# Patient Record
Sex: MALE | Race: WHITE | Employment: FULL TIME | URBAN - METROPOLITAN AREA
[De-identification: names, ages, dates, MRNs, and addresses within clinical notes are randomized per-mention and may not be internally consistent; named-entity substitution may affect disease eponyms.]

---

## 2017-01-18 ENCOUNTER — TELEPHONE (OUTPATIENT)
Dept: SLEEP MEDICINE | Age: 52
End: 2017-01-18

## 2017-03-14 ENCOUNTER — OFFICE VISIT (OUTPATIENT)
Dept: SLEEP MEDICINE | Age: 52
End: 2017-03-14

## 2017-03-14 VITALS
OXYGEN SATURATION: 100 % | HEIGHT: 70 IN | BODY MASS INDEX: 27.2 KG/M2 | TEMPERATURE: 98.1 F | DIASTOLIC BLOOD PRESSURE: 87 MMHG | WEIGHT: 190 LBS | SYSTOLIC BLOOD PRESSURE: 129 MMHG | HEART RATE: 61 BPM

## 2017-03-14 DIAGNOSIS — G47.33 OSA (OBSTRUCTIVE SLEEP APNEA): Primary | ICD-10-CM

## 2017-03-14 DIAGNOSIS — E66.3 OVERWEIGHT (BMI 25.0-29.9): ICD-10-CM

## 2017-03-14 RX ORDER — ATORVASTATIN CALCIUM 40 MG/1
TABLET, FILM COATED ORAL DAILY
COMMUNITY

## 2017-03-14 RX ORDER — TELMISARTAN 40 MG/1
40 TABLET ORAL DAILY
COMMUNITY

## 2017-03-14 NOTE — PROGRESS NOTES
HSAT Setup    · Patient was educated on proper hookup and operation of the HSAT. · Instruction forms and documentation were reviewed and signed. · The patient demonstrated good understanding of the HSAT. · General information regarding operations and maintenance of the device was provided. · Patient was provided information on sleep apnea including coresponding risk factors and the importance of proper treatment. · Follow-up appointment was made to return the HSAT. He will be contacted once the results have been reviewed. · Patient was asked to contact our office for any problems regarding his home sleep test study.

## 2017-03-14 NOTE — PROGRESS NOTES
217 [facility] [place]. [place], [address]  Tel.  [phone]  Fax. 100 [facility] 60  [place], [address] [facility]  Tel.  [phone]  Fax. [phone] [address]   Tel.  [phone]  Fax. [phone]         Subjective:      Gissell Mckeon is an 45 y.o. male referred for evaluation for a sleep disorder. He complains of excessive daytime sleepiness associated with awakening in the middle of the night because of snoring. Symptoms began a few years ago, gradually worsening since that time. He usually can fall asleep in 5 minutes. Family or house members note snoring, choking, periods of not breathing. He denies completely or partially paralyzed while falling asleep or waking up. Gissell Mckeon does not wake up frequently at night. He is not bothered by waking up too early and left unable to get back to sleep. He actually sleeps about 7 hours at night and wakes up about   times during the night. He does not work shifts:  . Maximus Moore indicates he does not get too little sleep at night. His bedtime is 2330. He awakens at 0700. He does not take naps. . He has the following observed behaviors: Loud snoring, Pauses in breathing, Sitting up in bed while still asleep, Getting out of the bed while still asleep, Sleep talking, Sleepwalking;  . Other remarks: waking with a gasp or snort    [place] Sleepiness Score: 8   which reflect moderate daytime drowsiness. Allergies   Allergen Reactions    Dilaudid [Hydromorphone (Bulk)] Nausea and Vomiting          Pcn [Penicillins] Hives     childhood         Current Outpatient Prescriptions:     atorvastatin (LIPITOR) 40 mg tablet, Take  by mouth daily. , Disp: , Rfl:     telmisartan (MICARDIS) 40 mg tablet, Take 40 mg by mouth daily. , Disp: , Rfl:     propranolol (INDERAL) 60 mg tablet, Take 1 Tab by mouth nightly., Disp: 30 Tab, Rfl: 0    fluticasone (FLONASE) 50 mcg/Actuation nasal spray, USE 1 SPRAYS IN EACH NOSTRIL EVERY DAY, Disp: 1 Bottle, Rfl: 5    amitriptyline (ELAVIL) 100 mg tablet, Take 1 Tab by mouth nightly., Disp: 30 Tab, Rfl: 0     He  has a past medical history of Chronic nasal congestion; Hypertension; and Migraine. He  has no past surgical history on file. He family history includes Heart Disease in an other family member; Hypertension in an other family member. He  reports that he has never smoked. He has never used smokeless tobacco. He reports that he drinks alcohol. He reports that he does not use illicit drugs. Review of Systems:  Constitutional:  No significant weight loss or weight gain. Eyes:  No blurred vision. CVS:  No significant chest pain  Pulm:  No significant shortness of breath  GI:  No significant nausea or vomiting  :  No significant nocturia  Musculoskeletal:  No significant joint pain at night  Skin:  No significant rashes  Neuro:  No significant dizziness   Psych:  No active mood issues    Sleep Review of Systems: notable for no difficulty falling asleep; frequent awakenings at night;  irregular dreaming noted; no nightmares ; no early morning headaches; no memory problems; no concentration issues; no history of any automobile or occupational accidents due to daytime drowsiness. Objective:     Visit Vitals    /87    Pulse 61    Temp 98.1 °F (36.7 °C)    Ht 5' 10\" (1.778 m)    Wt 190 lb (86.2 kg)    SpO2 100%    BMI 27.26 kg/m2         General:   Not in acute distress   Eyes:  Anicteric sclerae, no obvious strabismus   Nose:  No obvious nasal septum deviation    Oropharynx:   Class 3 oropharyngeal outlet, thick tongue base, enlarged uvula, low-lying soft palate, narrow tonsilo-pharyngeal pilars   Tonsils:   tonsils are unappreciated   Neck:   Neck circ.  in \"inches\": 15; midline trachea   Chest/Lungs:  Equal lung expansion, clear on auscultation    CVS:  Normal rate, regular rhythm; no JVD   Skin:  Warm to touch; no obvious rashes Neuro: No focal deficits ; no obvious tremor    Psych:  Normal affect,  normal countenance;          Assessment:       ICD-10-CM ICD-9-CM    1. KUSHAL (obstructive sleep apnea) G47.33 327.23    2. Overweight (BMI 25.0-29. 9) E66.3 278.02          Plan:     * The patient currently has a Moderate Risk for having sleep apnea. STOP-BANG score 5.  * PSG was ordered for initial evaluation. * He was provided information on sleep apnea including coresponding risk factors and the importance of proper treatment. * Counseling was provided regarding proper sleep hygiene and safe driving. * We'll call him with test results. * He is interested in an oral appliance if he meets criteria. I have reviewed/discussed the above normal BMI with the patient. I have recommended the following interventions: dietary management education, guidance, and counseling . The plan is as follows: I have counseled this patient on diet and exercise regimens. .    Thank you for allowing us to participate in your patient's medical care. We'll keep you updated on these investigations.     Marcelle Bloom M.D.  (electronically signed)  Diplomate in Sleep Medicine, [facility]

## 2017-03-14 NOTE — PATIENT INSTRUCTIONS
217 [facility] [place], [address]  Tel.  [phone]  Fax. 100 [facility] 60  [place], [address] [facility]  Tel.  [phone]  Fax. [phone] [address] [address] [address]ail [address]  Tel.  [phone]  Fax. [phone]     Sleep Apnea: After Your Visit  Your Care Instructions  Sleep apnea occurs when you frequently stop breathing for 10 seconds or longer during sleep. It can be mild to severe, based on the number of times per hour that you stop breathing or have slowed breathing. Blocked or narrowed airways in your nose, mouth, or throat can cause sleep apnea. Your airway can become blocked when your throat muscles and tongue relax during sleep. Sleep apnea is common, occurring in 1 out of 20 individuals. Individuals having any of the following characteristics should be evaluated and treated right away due to high risk and detrimental consequences from untreated sleep apnea:  1. Obesity  2. Congestive Heart failure  3. Atrial Fibrillation  4. Uncontrolled Hypertension  5. Type II Diabetes  6. Night-time Arrhythmias  7. Stroke  8. Pulmonary Hypertension  9. High-risk Driving Populations (pilots, truck drivers, etc.)  10. Patients Considering Weight-loss Surgery    How do you know you have sleep apnea? You probably have sleep apnea if you answer 'yes' to 3 or more of the following questions:  S - Have you been told that you Snore? T - Are you often Tired during the day? O - Has anyone Observed you stop breathing while sleeping? P- Do you have (or are being treated for) high blood Pressure? B - Are you obese (Body Mass Index > 35)? A - Is your Age 48years old or older? N - Is your Neck size greater than 16 inches? G - Are you male Gender? A sleep physician can prescribe a breathing device that prevents tissues in the throat from blocking your airway.  Or your doctor may recommend using a dental device (oral breathing device) to help keep your airway open. In some cases, surgery may be needed to remove enlarged tissues in the throat. Follow-up care is a key part of your treatment and safety. Be sure to make and go to all appointments, and call your doctor if you are having problems. It's also a good idea to know your test results and keep a list of the medicines you take. How can you care for yourself at home? · Lose weight, if needed. It may reduce the number of times you stop breathing or have slowed breathing. · Go to bed at the same time every night. · Sleep on your side. It may stop mild apnea. If you tend to roll onto your back, sew a pocket in the back of your pajama top. Put a tennis ball into the pocket, and stitch the pocket shut. This will help keep you from sleeping on your back. · Avoid alcohol and medicines such as sleeping pills and sedatives before bed. · Do not smoke. Smoking can make sleep apnea worse. If you need help quitting, talk to your doctor about stop-smoking programs and medicines. These can increase your chances of quitting for good. · Prop up the head of your bed 4 to 6 inches by putting bricks under the legs of the bed. · Treat breathing problems, such as a stuffy nose, caused by a cold or allergies. · Use a continuous positive airway pressure (CPAP) breathing machine if lifestyle changes do not help your apnea and your doctor recommends it. The machine keeps your airway from closing when you sleep. · If CPAP does not help you, ask your doctor whether you should try other breathing machines. A bilevel positive airway pressure machine has two types of air pressureâone for breathing in and one for breathing out. Another device raises or lowers air pressure as needed while you breathe. · If your nose feels dry or bleeds when using one of these machines, talk with your doctor about increasing moisture in the air. A humidifier may help.   · If your nose is runny or stuffy from using a breathing machine, talk with your doctor about using decongestants or a corticosteroid nasal spray. When should you call for help? Watch closely for changes in your health, and be sure to contact your doctor if:  · You still have sleep apnea even though you have made lifestyle changes. · You are thinking of trying a device such as CPAP. · You are having problems using a CPAP or similar machine. Where can you learn more? Go to MarginLeft. Enter Q665 in the search box to learn more about \"Sleep Apnea: After Your Visit. \"   © 0383-8265 Healthwise, [employer]. Care instructions adapted under license by Parviz Dong (which disclaims liability or warranty for this information). This care instruction is for use with your licensed healthcare professional. If you have questions about a medical condition or this instruction, always ask your healthcare professional. Duey Zackary any warranty or liability for your use of this information. PROPER SLEEP HYGIENE    What to avoid  · Do not have drinks with caffeine, such as coffee or black tea, for 8 hours before bed. · Do not smoke or use other types of tobacco near bedtime. Nicotine is a stimulant and can keep you awake. · Avoid drinking alcohol late in the evening, because it can cause you to wake in the middle of the night. · Do not eat a big meal close to bedtime. If you are hungry, eat a light snack. · Do not drink a lot of water close to bedtime, because the need to urinate may wake you up during the night. · Do not read or watch TV in bed. Use the bed only for sleeping and sexual activity. What to try  · Go to bed at the same time every night, and wake up at the same time every morning. Do not take naps during the day. · Keep your bedroom quiet, dark, and cool. · Get regular exercise, but not within 3 to 4 hours of your bedtime. .  · Sleep on a comfortable pillow and mattress.   · If watching the clock makes you anxious, turn it facing away from you so you cannot see the time. · If you worry when you lie down, start a worry book. Well before bedtime, write down your worries, and then set the book and your concerns aside. · Try meditation or other relaxation techniques before you go to bed. · If you cannot fall asleep, get up and go to another room until you feel sleepy. Do something relaxing. Repeat your bedtime routine before you go to bed again. · Make your house quiet and calm about an hour before bedtime. Turn down the lights, turn off the TV, log off the computer, and turn down the volume on music. This can help you relax after a busy day. Drowsy Driving  The [address] [place] [address] Road Traffic Safety Administration cites drowsiness as a causing factor in more than 880,040 police reported crashes annually, resulting in 76,000 injuries and 1,500 deaths. Other surveys suggest 55% of people polled have driven while drowsy in the past year, 23% had fallen asleep but not crashed, 3% crashed, and 2% had and accident due to drowsy driving. Who is at risk? Young Drivers: One study of drowsy driving accidents states that 55% of the drivers were under 25 years. Of those, 75% were male. Shift Workers and Travelers: People who work overnight or travel across time zones frequently are at higher risk of experiencing Circadian Rhythm Disorders. They are trying to work and function when their body is programed to sleep. Sleep Deprived: Lack of sleep has a serious impact on your ability to pay attention or focus on a task. Consistently getting less than the average of 8 hours your body needs creates partial or cumulative sleep deprivation. Untreated Sleep Disorders: Sleep Apnea, Narcolepsy, R.L.S., and other sleep disorders (untreated) prevent a person from getting enough restful sleep. This leads to excessive daytime sleepiness and increases the risk for drowsy driving accidents by up to 7 times.   Medications / Alcohol: Even over the counter medications can cause drowsiness. Medications that impair a drivers attention should have a warning label. Alcohol naturally makes you sleepy and on its own can cause accidents. Combined with excessive drowsiness its effects are amplified. Signs of Drowsy Driving:   * You don't remember driving the last few miles   * You may drift out of your benjamin   * You are unable to focus and your thoughts wander   * You may yawn more often than normal   * You have difficulty keeping your eyes open / nodding off   * Missing traffic signs, speeding, or tailgating  Prevention-   Good sleep hygiene, lifestyle and behavioral choices have the most impact on drowsy driving. There is no substitute for sleep and the average person requires 8 hours nightly. If you find yourself driving drowsy, stop and sleep. Consider the sleep hygiene tips provided during your visit as well. Medication Refill Policy: Refills for all medications require 1 week advance notice. Please have your pharmacy fax a refill request. We are unable to fax, or call in \"controled substance\" medications and you will need to pick these prescriptions up from our office. [employer] Activation    Thank you for requesting access to [employer]. Please follow the instructions below to securely access and download your online medical record. [employer] allows you to send messages to your doctor, view your test results, renew your prescriptions, schedule appointments, and more. How Do I Sign Up? 1. In your internet browser, go to [email or web address]. 2. Click on the First Time User? Click Here link in the Sign In box. You will see the New Member Sign Up page. 3. Enter your [employer] Access Code exactly as it appears below. You will not need to use this code after youve completed the sign-up process. If you do not sign up before the expiration date, you must request a new code. [employer] Access Code:  [email or web address]  Expires: 6/12/2017  3:16 PM (This is the date your [employer] access code will )    4. Enter the last four digits of your Social Security Number (xxxx) and Date of Birth (mm/dd/yyyy) as indicated and click Submit. You will be taken to the next sign-up page. 5. Create a [employer] ID. This will be your [employer] login ID and cannot be changed, so think of one that is secure and easy to remember. 6. Create a [employer] password. You can change your password at any time. 7. Enter your Password Reset Question and Answer. This can be used at a later time if you forget your password. 8. Enter your e-mail address. You will receive e-mail notification when new information is available in [address] 9. Click Sign Up. You can now view and download portions of your medical record. 10. Click the Download Summary menu link to download a portable copy of your medical information. Additional Information    If you have questions, please call [phone]. Remember, [employer] is NOT to be used for urgent needs. For medical emergencies, dial 911. Starting a Weight Loss Plan: After Your Visit  Your Care Instructions  If you are thinking about losing weight, it can be hard to know where to start. Your doctor can help you set up a weight loss plan that best meets your needs. You may want to take a class on nutrition or exercise, or join a weight loss support group. If you have questions about how to make changes to your eating or exercise habits, ask your doctor about seeing a registered dietitian or an exercise specialist.  It can be a big challenge to lose weight. But you do not have to make huge changes at once. Make small changes, and stick with them. When those changes become habit, add a few more changes. If you do not think you are ready to make changes right now, try to pick a date in the future. Make an appointment to see your doctor to discuss whether the time is right for you to start a plan. Follow-up care is a key part of your treatment and safety.  Be sure to make and go to all appointments, and call your doctor if you are having problems. It's also a good idea to know your test results and keep a list of the medicines you take. How can you care for yourself at home? · Set realistic goals. Many people expect to lose much more weight than is likely. A weight loss of 5% to 10% of your body weight may be enough to improve your health. · Get family and friends involved to provide support. Talk to them about why you are trying to lose weight, and ask them to help. They can help by participating in exercise and having meals with you, even if they may be eating something different. · Find what works best for you. If you do not have time or do not like to cook, a program that offers meal replacement bars or shakes may be better for you. Or if you like to prepare meals, finding a plan that includes daily menus and recipes may be best.  · Ask your doctor about other health professionals who can help you achieve your weight loss goals. ¨ A dietitian can help you make healthy changes in your diet. ¨ An exercise specialist or  can help you develop a safe and effective exercise program.  ¨ A counselor or psychiatrist can help you cope with issues such as depression, anxiety, or family problems that can make it hard to focus on weight loss. · Consider joining a support group for people who are trying to lose weight. Your doctor can suggest groups in your area. Where can you learn more? Go to Flourish Prenatal.be  Enter U357 in the search box to learn more about \"Starting a Weight Loss Plan: After Your Visit. \"   © 8349-3299 Healthwise, Incorporated. Care instructions adapted under license by Roxy Duran (which disclaims liability or warranty for this information).  This care instruction is for use with your licensed healthcare professional. If you have questions about a medical condition or this instruction, always ask your healthcare professional. [address] any warranty or liability for your use of this information.   Content Version: 0.9.97329; Last Revised: September 1, 2009

## 2017-03-20 ENCOUNTER — TELEPHONE (OUTPATIENT)
Dept: SLEEP MEDICINE | Age: 52
End: 2017-03-20

## 2017-03-20 DIAGNOSIS — G47.33 OSA (OBSTRUCTIVE SLEEP APNEA): Primary | ICD-10-CM

## 2017-03-20 NOTE — TELEPHONE ENCOUNTER
HSAT Returned    Date of Study: 3/14/2017    The following information was gathered from the patients study log:    · Lights off: 10:45 PM  · Estimated sleep onset: 11:00 PM    · Awakened a total of 0 times  · The patient felt they slept 8 hours  · Patient took nothing before starting the test  · Sleep quality was same compared to a usual nights sleep.     Further information provided: N/A

## 2017-03-20 NOTE — TELEPHONE ENCOUNTER
217 [facility] [place]. [place], [address]  Tel.  [phone]  Fax. 100 [facility] 60  [place], [address] [facility]  Tel.  [phone]  Fax. [phone] [address] Abigail Mayes [address]  Tel.  [phone]  Fax. [phone]   Polysomnogram was performed and the results of the study were explained to the patient. Please refer to interpretation report for further details. Apnea/Hypopnea index of 41 which indicates severe apnea. He continues to have snoring. * Treatment options were offered. He has elected to proceed with a positive airway pressure trial (CPAP). * We have written his PAP order  * PAP card download in 4 weeks. PAP clinic if adherence remains poor  * Counseling was provided regarding the importance of regular PAP use and on proper sleep hygiene and safe driving. Thank you for allowing to participate in your patient's medical care. We'll keep you updated on these investigations.     Kevin Mayberry M.D.  (electronically signed)  Diplomate in Sleep Medicine, [facility]

## 2017-03-21 ENCOUNTER — DOCUMENTATION ONLY (OUTPATIENT)
Dept: SLEEP MEDICINE | Age: 52
End: 2017-03-21

## 2017-03-21 ENCOUNTER — TELEPHONE (OUTPATIENT)
Dept: SLEEP MEDICINE | Age: 52
End: 2017-03-21

## 2017-03-21 NOTE — TELEPHONE ENCOUNTER
Patient was called and made aware of his DME company and he was advised to contact our office if he had any questions or concerns so we could schedule a follow up visit.

## 2017-03-31 ENCOUNTER — TELEPHONE (OUTPATIENT)
Dept: SLEEP MEDICINE | Age: 52
End: 2017-03-31

## 2017-03-31 NOTE — TELEPHONE ENCOUNTER
Patient called, due to financial restraints, he is unable to be set up on CPAP Therapy at the moment. He has interest in the OAT. He would like to know if this is a possible option for him.

## 2017-04-18 ENCOUNTER — DOCUMENTATION ONLY (OUTPATIENT)
Dept: SLEEP MEDICINE | Age: 52
End: 2017-04-18

## 2017-04-18 DIAGNOSIS — G47.33 OSA (OBSTRUCTIVE SLEEP APNEA): Primary | ICD-10-CM

## 2017-04-20 NOTE — PROGRESS NOTES
Patient declined setup with [facility]. He stated that he will order a PAP device online.

## 2017-05-16 ENCOUNTER — DOCUMENTATION ONLY (OUTPATIENT)
Dept: SLEEP MEDICINE | Age: 52
End: 2017-05-16

## 2018-02-14 ENCOUNTER — HOSPITAL ENCOUNTER (OUTPATIENT)
Dept: NON INVASIVE DIAGNOSTICS | Age: 53
Discharge: HOME | End: 2018-02-14
Payer: COMMERCIAL

## 2018-02-14 DIAGNOSIS — Z01.818 PRE-OP EXAM: ICD-10-CM

## 2018-02-14 PROCEDURE — 93005 ELECTROCARDIOGRAM TRACING: CPT

## 2018-02-15 LAB
ATRIAL RATE: 70 BPM
CALCULATED P AXIS, ECG09: 68 DEGREES
CALCULATED R AXIS, ECG10: 18 DEGREES
CALCULATED T AXIS, ECG11: 65 DEGREES
DIAGNOSIS, 93000: NORMAL
P-R INTERVAL, ECG05: 158 MS
Q-T INTERVAL, ECG07: 392 MS
QRS DURATION, ECG06: 96 MS
QTC CALCULATION (BEZET), ECG08: 423 MS
VENTRICULAR RATE, ECG03: 70 BPM